# Patient Record
Sex: FEMALE | Race: OTHER | Employment: FULL TIME | URBAN - METROPOLITAN AREA
[De-identification: names, ages, dates, MRNs, and addresses within clinical notes are randomized per-mention and may not be internally consistent; named-entity substitution may affect disease eponyms.]

---

## 2023-01-20 ENCOUNTER — OFFICE VISIT (OUTPATIENT)
Dept: FAMILY MEDICINE CLINIC | Age: 33
End: 2023-01-20
Payer: COMMERCIAL

## 2023-01-20 VITALS
BODY MASS INDEX: 19.97 KG/M2 | WEIGHT: 117 LBS | OXYGEN SATURATION: 97 % | DIASTOLIC BLOOD PRESSURE: 68 MMHG | HEART RATE: 73 BPM | TEMPERATURE: 96.1 F | HEIGHT: 64 IN | SYSTOLIC BLOOD PRESSURE: 98 MMHG

## 2023-01-20 DIAGNOSIS — R68.89 FLU-LIKE SYMPTOMS: Primary | ICD-10-CM

## 2023-01-20 DIAGNOSIS — B34.9 VIRAL ILLNESS: ICD-10-CM

## 2023-01-20 LAB
INFLUENZA A ANTIBODY: NORMAL
INFLUENZA B ANTIBODY: NORMAL
Lab: NORMAL
PERFORMING INSTRUMENT: NORMAL
QC PASS/FAIL: NORMAL
SARS-COV-2, POC: NORMAL

## 2023-01-20 PROCEDURE — 99213 OFFICE O/P EST LOW 20 MIN: CPT | Performed by: NURSE PRACTITIONER

## 2023-01-20 PROCEDURE — 87804 INFLUENZA ASSAY W/OPTIC: CPT | Performed by: NURSE PRACTITIONER

## 2023-01-20 PROCEDURE — 87426 SARSCOV CORONAVIRUS AG IA: CPT | Performed by: NURSE PRACTITIONER

## 2023-01-20 SDOH — ECONOMIC STABILITY: FOOD INSECURITY: WITHIN THE PAST 12 MONTHS, THE FOOD YOU BOUGHT JUST DIDN'T LAST AND YOU DIDN'T HAVE MONEY TO GET MORE.: NEVER TRUE

## 2023-01-20 SDOH — ECONOMIC STABILITY: FOOD INSECURITY: WITHIN THE PAST 12 MONTHS, YOU WORRIED THAT YOUR FOOD WOULD RUN OUT BEFORE YOU GOT MONEY TO BUY MORE.: NEVER TRUE

## 2023-01-20 ASSESSMENT — PATIENT HEALTH QUESTIONNAIRE - PHQ9
SUM OF ALL RESPONSES TO PHQ QUESTIONS 1-9: 0
1. LITTLE INTEREST OR PLEASURE IN DOING THINGS: 0
SUM OF ALL RESPONSES TO PHQ9 QUESTIONS 1 & 2: 0
SUM OF ALL RESPONSES TO PHQ QUESTIONS 1-9: 0
2. FEELING DOWN, DEPRESSED OR HOPELESS: 0
SUM OF ALL RESPONSES TO PHQ QUESTIONS 1-9: 0
SUM OF ALL RESPONSES TO PHQ QUESTIONS 1-9: 0

## 2023-01-20 ASSESSMENT — ENCOUNTER SYMPTOMS
EYE REDNESS: 0
NAUSEA: 0
COUGH: 1
EYE PAIN: 0
DIARRHEA: 0
VOMITING: 0
ABDOMINAL PAIN: 0
BACK PAIN: 0
WHEEZING: 0
PHOTOPHOBIA: 0
SINUS PAIN: 0
SHORTNESS OF BREATH: 0
SORE THROAT: 0

## 2023-01-20 ASSESSMENT — SOCIAL DETERMINANTS OF HEALTH (SDOH): HOW HARD IS IT FOR YOU TO PAY FOR THE VERY BASICS LIKE FOOD, HOUSING, MEDICAL CARE, AND HEATING?: NOT HARD AT ALL

## 2023-01-20 NOTE — PROGRESS NOTES
Subjective:      Patient ID: All Joseph is a 34 y.o. female who presents today for:  Chief Complaint   Patient presents with    URI     Fever, body aches, sore throat, sore gland, start 1/19       HPI pt states congestion for few days- but then started with a fever yesterday and just feels ran over and burning behind her eyes. Feels like her glands are swollen on the right side    No past medical history on file. No past surgical history on file. No family history on file. Social History     Socioeconomic History    Marital status: Single     Spouse name: Not on file    Number of children: Not on file    Years of education: Not on file    Highest education level: Not on file   Occupational History    Not on file   Tobacco Use    Smoking status: Never    Smokeless tobacco: Never   Substance and Sexual Activity    Alcohol use: Never    Drug use: Never    Sexual activity: Not on file   Other Topics Concern    Not on file   Social History Narrative    Not on file     Social Determinants of Health     Financial Resource Strain: Low Risk     Difficulty of Paying Living Expenses: Not hard at all   Food Insecurity: No Food Insecurity    Worried About Running Out of Food in the Last Year: Never true    Ran Out of Food in the Last Year: Never true   Transportation Needs: Not on file   Physical Activity: Not on file   Stress: Not on file   Social Connections: Not on file   Intimate Partner Violence: Not on file   Housing Stability: Not on file     No current outpatient medications on file prior to visit. No current facility-administered medications on file prior to visit.     :  Patient has no known allergies. Review of Systems   Constitutional:  Positive for chills and fever. Negative for diaphoresis. HENT:  Positive for congestion and nosebleeds. Negative for ear pain, sinus pain, sore throat and tinnitus. Eyes:  Negative for photophobia, pain and redness. Respiratory:  Positive for cough. Negative for shortness of breath and wheezing. Cardiovascular:  Negative for chest pain, palpitations and leg swelling. Gastrointestinal:  Negative for abdominal pain, diarrhea, nausea and vomiting. Genitourinary:  Negative for dysuria, flank pain, frequency and urgency. Musculoskeletal:  Negative for back pain and myalgias. Skin:  Negative for rash. Neurological:  Negative for dizziness, tremors, seizures, weakness and headaches. Hematological:  Does not bruise/bleed easily. Psychiatric/Behavioral:  The patient is not nervous/anxious. Objective:   BP 98/68   Pulse 73   Temp (!) 96.1 °F (35.6 °C)   Ht 5' 4\" (1.626 m)   Wt 117 lb (53.1 kg)   SpO2 97%   BMI 20.08 kg/m²     Physical Exam  Vitals reviewed: flushed. Constitutional:       General: She is not in acute distress. Appearance: She is not diaphoretic. HENT:      Head: Normocephalic and atraumatic. No Steiner's sign, right periorbital erythema or left periorbital erythema. Jaw: No trismus. Right Ear: There is impacted cerumen. Left Ear: There is impacted cerumen. Nose: Congestion present. No rhinorrhea. Mouth/Throat:      Mouth: Mucous membranes are moist.      Pharynx: Posterior oropharyngeal erythema present. Eyes:      General: No scleral icterus. Extraocular Movements:      Right eye: Normal extraocular motion. Left eye: Normal extraocular motion. Conjunctiva/sclera: Conjunctivae normal.      Right eye: Right conjunctiva is not injected. Left eye: Left conjunctiva is not injected. Pupils: Pupils are equal, round, and reactive to light. Neck:      Thyroid: No thyromegaly. Trachea: Trachea normal. No tracheal deviation. Meningeal: Brudzinski's sign absent. Cardiovascular:      Rate and Rhythm: Normal rate and regular rhythm. Heart sounds: Normal heart sounds. No murmur heard. No gallop.    Pulmonary:      Effort: Pulmonary effort is normal.      Breath sounds: Normal breath sounds. No wheezing or rales. Abdominal:      General: Bowel sounds are normal. There is no distension. Palpations: Abdomen is soft. Tenderness: There is no abdominal tenderness. There is no guarding. Musculoskeletal:         General: Normal range of motion. Cervical back: Normal range of motion and neck supple. Lymphadenopathy:      Cervical: No cervical adenopathy. Skin:     General: Skin is warm and dry. Coloration: Skin is not pale. Findings: No erythema or rash. Neurological:      Mental Status: She is alert and oriented to person, place, and time. Cranial Nerves: No cranial nerve deficit. Coordination: Coordination normal.       Procedures   :       Diagnosis Orders   1. Flu-like symptoms  POCT COVID-19, Antigen    POCT Influenza A/B      2. Viral illness            :      Orders Placed This Encounter   Procedures    POCT COVID-19, Antigen     Order Specific Question:   Is this test for diagnosis or screening? Answer:   Screening     Order Specific Question:   Symptomatic for COVID-19 as defined by CDC? Answer:   No     Order Specific Question:   Date of Symptom Onset     Answer:   N/A     Order Specific Question:   Hospitalized for COVID-19? Answer:   No     Order Specific Question:   Admitted to ICU for COVID-19? Answer:   No     Order Specific Question:   Employed in healthcare setting? Answer:   No     Order Specific Question:   Resident in a congregate (group) care setting? Answer:   No     Order Specific Question:   Pregnant? Answer:   No     Order Specific Question:   Previously tested for COVID-19? Answer:   Yes    POCT Influenza A/B         No orders of the defined types were placed in this encounter.     No lymph nodes enlarged  Pt has tenderness at jaw line by ear- but no noted glad enlargement  Spoke with pt to add ibuprofen for the anti inflammatory effect and to keep taking her decongestant   All testing negative here today   But continue with symptomatic tx    Return if symptoms worsen or fail to improve.    Reviewed with the patient: current clinicalstatus, medications, activities and diet.     Side effects, adverse effects of the medication prescribedtoday, as well as treatment plan/ rationale and result expectations have been discussedwith the patient who expresses understanding and desires to proceed.    Close follow upto evaluate treatment results and for coordination of care.  I have reviewedthe patient's medical history in detail and updated the computerized patient record.    Dyana Barajas, APRN - CNP

## 2023-01-27 ENCOUNTER — OFFICE VISIT (OUTPATIENT)
Dept: FAMILY MEDICINE CLINIC | Age: 33
End: 2023-01-27

## 2023-01-27 VITALS
RESPIRATION RATE: 18 BRPM | HEIGHT: 64 IN | TEMPERATURE: 97 F | DIASTOLIC BLOOD PRESSURE: 68 MMHG | WEIGHT: 116 LBS | BODY MASS INDEX: 19.81 KG/M2 | SYSTOLIC BLOOD PRESSURE: 100 MMHG | OXYGEN SATURATION: 98 % | HEART RATE: 91 BPM

## 2023-01-27 DIAGNOSIS — M54.6 CHRONIC MIDLINE THORACIC BACK PAIN: ICD-10-CM

## 2023-01-27 DIAGNOSIS — R53.83 OTHER FATIGUE: ICD-10-CM

## 2023-01-27 DIAGNOSIS — G89.29 CHRONIC MIDLINE THORACIC BACK PAIN: ICD-10-CM

## 2023-01-27 DIAGNOSIS — Z00.00 ENCOUNTER FOR MEDICAL EXAMINATION TO ESTABLISH CARE: ICD-10-CM

## 2023-01-27 DIAGNOSIS — M54.41 CHRONIC BILATERAL LOW BACK PAIN WITH RIGHT-SIDED SCIATICA: Primary | ICD-10-CM

## 2023-01-27 DIAGNOSIS — G89.29 CHRONIC BILATERAL LOW BACK PAIN WITH RIGHT-SIDED SCIATICA: Primary | ICD-10-CM

## 2023-01-27 ASSESSMENT — PATIENT HEALTH QUESTIONNAIRE - PHQ9
SUM OF ALL RESPONSES TO PHQ9 QUESTIONS 1 & 2: 2
6. FEELING BAD ABOUT YOURSELF - OR THAT YOU ARE A FAILURE OR HAVE LET YOURSELF OR YOUR FAMILY DOWN: 1
3. TROUBLE FALLING OR STAYING ASLEEP: 1
SUM OF ALL RESPONSES TO PHQ QUESTIONS 1-9: 8
8. MOVING OR SPEAKING SO SLOWLY THAT OTHER PEOPLE COULD HAVE NOTICED. OR THE OPPOSITE, BEING SO FIGETY OR RESTLESS THAT YOU HAVE BEEN MOVING AROUND A LOT MORE THAN USUAL: 1
2. FEELING DOWN, DEPRESSED OR HOPELESS: 1
SUM OF ALL RESPONSES TO PHQ QUESTIONS 1-9: 9
SUM OF ALL RESPONSES TO PHQ QUESTIONS 1-9: 9
10. IF YOU CHECKED OFF ANY PROBLEMS, HOW DIFFICULT HAVE THESE PROBLEMS MADE IT FOR YOU TO DO YOUR WORK, TAKE CARE OF THINGS AT HOME, OR GET ALONG WITH OTHER PEOPLE: 0
9. THOUGHTS THAT YOU WOULD BE BETTER OFF DEAD, OR OF HURTING YOURSELF: 1
SUM OF ALL RESPONSES TO PHQ QUESTIONS 1-9: 9
5. POOR APPETITE OR OVEREATING: 1
1. LITTLE INTEREST OR PLEASURE IN DOING THINGS: 1
7. TROUBLE CONCENTRATING ON THINGS, SUCH AS READING THE NEWSPAPER OR WATCHING TELEVISION: 1
4. FEELING TIRED OR HAVING LITTLE ENERGY: 1

## 2023-01-27 ASSESSMENT — ENCOUNTER SYMPTOMS
BOWEL INCONTINENCE: 0
SINUS PRESSURE: 0
BACK PAIN: 1
COUGH: 0
VOMITING: 0
SINUS PAIN: 0
NAUSEA: 0
ABDOMINAL PAIN: 0
SORE THROAT: 0
DIARRHEA: 0
SHORTNESS OF BREATH: 0
CHEST TIGHTNESS: 0

## 2023-01-27 ASSESSMENT — COLUMBIA-SUICIDE SEVERITY RATING SCALE - C-SSRS
6. HAVE YOU EVER DONE ANYTHING, STARTED TO DO ANYTHING, OR PREPARED TO DO ANYTHING TO END YOUR LIFE?: NO
2. HAVE YOU ACTUALLY HAD ANY THOUGHTS OF KILLING YOURSELF?: NO
1. WITHIN THE PAST MONTH, HAVE YOU WISHED YOU WERE DEAD OR WISHED YOU COULD GO TO SLEEP AND NOT WAKE UP?: NO

## 2023-01-27 ASSESSMENT — VISUAL ACUITY: OU: 1

## 2023-01-27 NOTE — PROGRESS NOTES
Subjective  Travis STEPHENSON 1990 is a 34 y.o. female who presents today with:  Chief Complaint   Patient presents with    Establish Care    Back Pain     X 3 years; recently has been getting worse; between mid and upper     Hand Pain     Bilateral hand pain; cramps up      Est. Care. New to [facility]. Back Pain  This is a chronic problem. Episode onset: x3 years ago lower back. Now into middle x2.5 years. The problem occurs constantly. The pain is present in the thoracic spine and lumbar spine. The quality of the pain is described as aching. Pain scale: 3/10 with activity 7/10. The pain is moderate. The symptoms are aggravated by bending and twisting. Stiffness is present All day. Associated symptoms include leg pain (occasionally- right side) and paresthesias. Pertinent negatives include no abdominal pain, bladder incontinence, bowel incontinence, chest pain, dysuria, fever, headaches, numbness, paresis, pelvic pain, perianal numbness, tingling, weakness or weight loss. Risk factors include history of cancer. Depression screening positive  - Seasonal Depression- believes, usually having a lot of sun where she lived and different here. - Using light therapy to help. Working well. Paternal Side-  Ovarian cancer- cousin's younger (30 years ago). Aunt (51years). Grandmother (64 years)  Liver cancer  Lupus    Maternal  - HTN  - Prediabetic      Review of Systems   Constitutional:  Negative for activity change, appetite change, chills, fever and weight loss. HENT:  Negative for congestion, drooling, sinus pressure, sinus pain and sore throat. Eyes:  Negative for visual disturbance. Respiratory:  Negative for cough, chest tightness and shortness of breath. Cardiovascular:  Negative for chest pain. Gastrointestinal:  Negative for abdominal pain, bowel incontinence, diarrhea, nausea and vomiting. Endocrine: Negative for cold intolerance.    Genitourinary:  Negative for bladder incontinence, dysuria, flank pain, frequency, hematuria and pelvic pain. Musculoskeletal:  Positive for back pain. Negative for arthralgias. Skin:  Negative for rash. Allergic/Immunologic: Negative for food allergies. Neurological:  Positive for paresthesias. Negative for tingling, weakness, light-headedness, numbness and headaches. Hematological:  Does not bruise/bleed easily. No past medical history on file. No past surgical history on file. Social History     Socioeconomic History    Marital status: Single     Spouse name: Not on file    Number of children: Not on file    Years of education: Not on file    Highest education level: Not on file   Occupational History    Not on file   Tobacco Use    Smoking status: Never    Smokeless tobacco: Never   Substance and Sexual Activity    Alcohol use: Never    Drug use: Never    Sexual activity: Not on file   Other Topics Concern    Not on file   Social History Narrative    Not on file     Social Determinants of Health     Financial Resource Strain: Low Risk     Difficulty of Paying Living Expenses: Not hard at all   Food Insecurity: No Food Insecurity    Worried About Running Out of Food in the Last Year: Never true    Ran Out of Food in the Last Year: Never true   Transportation Needs: Not on file   Physical Activity: Not on file   Stress: Not on file   Social Connections: Not on file   Intimate Partner Violence: Not on file   Housing Stability: Not on file     Family History   Problem Relation Age of Onset    Diabetes Mother     High Blood Pressure Mother     Lupus Father      No Known Allergies  No current outpatient medications on file. No current facility-administered medications for this visit. PMH, Surgical Hx, Family Hx, and Social Hx reviewed and updated. Health Maintenance reviewed.     Objective  Vitals:    01/27/23 1615   BP: 100/68   Pulse: 91   Resp: 18   Temp: 97 °F (36.1 °C)   TempSrc: Temporal   SpO2: 98%   Weight: 116 lb (52.6 kg)   Height: 5' 4\" (1.626 m)     BP Readings from Last 3 Encounters:   01/27/23 100/68   01/20/23 98/68     Wt Readings from Last 3 Encounters:   01/27/23 116 lb (52.6 kg)   01/20/23 117 lb (53.1 kg)       No results found for: LABA1C  Lab Results   Component Value Date    CREATININE 0.66 03/30/2021     Lab Results   Component Value Date    ALT 12 03/30/2021    AST 15 03/30/2021     Lab Results   Component Value Date    CHOL 163 03/30/2021    TRIG 71 03/30/2021    HDL 54 03/30/2021    [address] 95 03/30/2021          Physical Exam  Vitals and nursing note reviewed. Constitutional:       General: She is awake. She is not in acute distress. Appearance: Normal appearance. She is well-developed. She is not ill-appearing, toxic-appearing or diaphoretic. HENT:      Head: Normocephalic and atraumatic. Right Ear: Hearing and external ear normal.      Left Ear: Hearing and external ear normal.      Nose: Nose normal.   Eyes:      General: Lids are normal. Vision grossly intact. Gaze aligned appropriately. Conjunctiva/sclera: Conjunctivae normal.      Pupils: Pupils are equal, round, and reactive to light. Cardiovascular:      Rate and Rhythm: Normal rate and regular rhythm. Pulses: Normal pulses. Heart sounds: Normal heart sounds, S1 normal and S2 normal.   Pulmonary:      Effort: Pulmonary effort is normal.      Breath sounds: Normal breath sounds and air entry. Musculoskeletal:      Cervical back: Normal range of motion. Skin:     General: Skin is warm. Capillary Refill: Capillary refill takes less than 2 seconds. Neurological:      Mental Status: She is alert and oriented to person, place, and time. Gait: Gait is intact. Psychiatric:         Attention and Perception: Attention normal.         Mood and Affect: Mood normal.         Speech: Speech normal.         Behavior: Behavior normal. Behavior is cooperative. Assessment & Plan   1.  Chronic bilateral low back pain with right-sided sciatica  -     XR LUMBAR SPINE (2-3 VIEWS); Future  -     [facility] - [place]  2. Chronic midline thoracic back pain  -     XR THORACIC SPINE (2 VIEWS); Future  -     [facility] - [place]  3. Other fatigue  -     Vitamin D 25 Hydroxy; Future  4. Encounter for medical examination to establish care   Orders Placed This Encounter   Procedures    XR LUMBAR SPINE (2-3 VIEWS)     Standing Status:   Future     Standing Expiration Date:   1/27/2024     Order Specific Question:   Reason for exam:     Answer:   TTP over lumbar spine, no step of deformity. Carroll tendersness. XR THORACIC SPINE (2 VIEWS)     Standing Status:   Future     Standing Expiration Date:   1/27/2024     Order Specific Question:   Reason for exam:     Answer:   TTP over thoracic spine, no step of deformity. Carroll tendersness. Vitamin D 25 Hydroxy     Standing Status:   Future     Standing Expiration Date:   1/27/2024    [facility] - [place]     Referral Priority:   Routine     Referral Type:   Eval and Treat     Referral Reason:   Specialty Services Required     Requested Specialty:   Physical Therapist     Number of Visits Requested:   1     No orders of the defined types were placed in this encounter. There are no discontinued medications. Return in about 6 months (around 7/27/2023) for Follow Up. Reviewed with the patient: current clinical status, medications, activities and diet. Side effects, adverse effects of the medication prescribed today, as well as treatment plan/ rationale and result expectations have been discussed with the patient who expresses understanding and desires to proceed. Close follow up to evaluate treatment results and for coordination of care. I have reviewed the patient's medical history in detail and updated the computerized patient record.     Payton Seymour

## 2023-01-30 ENCOUNTER — HOSPITAL ENCOUNTER (OUTPATIENT)
Dept: GENERAL RADIOLOGY | Age: 33
Discharge: HOME | End: 2023-02-01
Payer: COMMERCIAL

## 2023-01-30 DIAGNOSIS — G89.29 CHRONIC BILATERAL LOW BACK PAIN WITH RIGHT-SIDED SCIATICA: ICD-10-CM

## 2023-01-30 DIAGNOSIS — M54.41 CHRONIC BILATERAL LOW BACK PAIN WITH RIGHT-SIDED SCIATICA: ICD-10-CM

## 2023-01-30 DIAGNOSIS — M54.6 CHRONIC MIDLINE THORACIC BACK PAIN: ICD-10-CM

## 2023-01-30 DIAGNOSIS — G89.29 CHRONIC MIDLINE THORACIC BACK PAIN: ICD-10-CM

## 2023-01-30 PROCEDURE — 72100 X-RAY EXAM L-S SPINE 2/3 VWS: CPT

## 2023-01-30 PROCEDURE — 72070 X-RAY EXAM THORAC SPINE 2VWS: CPT

## 2023-02-02 DIAGNOSIS — R53.83 OTHER FATIGUE: ICD-10-CM

## 2023-02-02 LAB — VITAMIN D 25-HYDROXY: 26.1 NG/ML

## 2023-03-13 ENCOUNTER — TELEMEDICINE (OUTPATIENT)
Dept: FAMILY MEDICINE CLINIC | Age: 33
End: 2023-03-13
Payer: COMMERCIAL

## 2023-03-13 DIAGNOSIS — L71.9 ACNE ROSACEA: Primary | ICD-10-CM

## 2023-03-13 PROCEDURE — 99213 OFFICE O/P EST LOW 20 MIN: CPT | Performed by: PHYSICIAN ASSISTANT

## 2023-03-13 RX ORDER — METRONIDAZOLE 7.5 MG/G
GEL TOPICAL
Qty: 45 G | Refills: 1 | Status: SHIPPED | OUTPATIENT
Start: 2023-03-13

## 2023-03-13 SDOH — ECONOMIC STABILITY: FOOD INSECURITY: WITHIN THE PAST 12 MONTHS, THE FOOD YOU BOUGHT JUST DIDN'T LAST AND YOU DIDN'T HAVE MONEY TO GET MORE.: NEVER TRUE

## 2023-03-13 SDOH — ECONOMIC STABILITY: INCOME INSECURITY: HOW HARD IS IT FOR YOU TO PAY FOR THE VERY BASICS LIKE FOOD, HOUSING, MEDICAL CARE, AND HEATING?: NOT HARD AT ALL

## 2023-03-13 SDOH — ECONOMIC STABILITY: HOUSING INSECURITY
IN THE LAST 12 MONTHS, WAS THERE A TIME WHEN YOU DID NOT HAVE A STEADY PLACE TO SLEEP OR SLEPT IN A SHELTER (INCLUDING NOW)?: NO

## 2023-03-13 SDOH — ECONOMIC STABILITY: FOOD INSECURITY: WITHIN THE PAST 12 MONTHS, YOU WORRIED THAT YOUR FOOD WOULD RUN OUT BEFORE YOU GOT MONEY TO BUY MORE.: NEVER TRUE

## 2023-03-13 ASSESSMENT — ENCOUNTER SYMPTOMS
SHORTNESS OF BREATH: 0
SINUS PRESSURE: 0
BACK PAIN: 0
SORE THROAT: 0
VOMITING: 0
COUGH: 0
CHEST TIGHTNESS: 0
DIARRHEA: 0
SINUS PAIN: 0
ABDOMINAL PAIN: 0
NAUSEA: 0

## 2023-03-13 NOTE — PROGRESS NOTES
3/13/2023    TELEHEALTH EVALUATION -- Audio/Visual ([facility])    Due to Ann-Marie 19 outbreak, patient's office visit was converted to a virtual visit. Patient was contacted and agreed to proceed with a virtual visit via [email or web address] me  The risks and benefits of converting to a virtual visit were discussed in light of the current infectious disease epidemic. Patient also understood that insurance coverage and co-pays are up to their individual insurance plans. HPI:    Aiden Sanders (:  1990) has requested an audio/video evaluation for the following concern(s):    Patient has history of rosacea. Has been on metronidazole gel in the past that really helped with her symptoms in the past. Patient stopped using it for a while after improvement. Now is starting to have recurrent symptoms. No fevers, chills, or photosensitivity. Review of Systems   Constitutional:  Negative for activity change, appetite change, chills and fever. HENT:  Negative for congestion, drooling, sinus pressure, sinus pain and sore throat. Eyes:  Negative for visual disturbance. Respiratory:  Negative for cough, chest tightness and shortness of breath. Cardiovascular:  Negative for chest pain. Gastrointestinal:  Negative for abdominal pain, diarrhea, nausea and vomiting. Endocrine: Negative for cold intolerance. Genitourinary:  Negative for dysuria, flank pain, frequency and hematuria. Musculoskeletal:  Negative for arthralgias and back pain. Skin:  Positive for rash. Allergic/Immunologic: Negative for food allergies. Neurological:  Negative for weakness, light-headedness, numbness and headaches. Hematological:  Does not bruise/bleed easily. Prior to Visit Medications    Medication Sig Taking? Authorizing Provider   metroNIDAZOLE (METROGEL) 0.75 % gel Apply topically 2 times daily.  Yes LEOLA Wilson       Social History     Tobacco Use    Smoking status: Never Smokeless tobacco: Never   Substance Use Topics    Alcohol use: Never    Drug use: Never            PHYSICAL EXAMINATION:  [ INSTRUCTIONS:  \"[x]\" Indicates a positive item  \"[]\" Indicates a negative item  -- DELETE ALL ITEMS NOT EXAMINED]  [x] Alert  [x] Oriented to person/place/time    [x] No apparent distress  [] Toxic appearing    [x] Face flushed appearing [x] Sclera clear  [] Lips are cyanotic      [x] Breathing appears normal  [] Appears tachypneic      [x] Rash on visible skin    [] Cranial Nerves II-XII grossly intact    [] Motor grossly intact in visible upper extremities    [] Motor grossly intact in visible lower extremities    [] Normal Mood  [] Anxious appearing    [] Depressed appearing  [] Confused appearing      [] Poor short term memory  [] Poor long term memory    [] OTHER:  face appear red and flushed around the cheeks. Due to this being a TeleHealth encounter, evaluation of the following organ systems is limited: Vitals/Constitutional/EENT/Resp/CV/GI//MS/Neuro/Skin/Heme-Lymph-Imm. ASSESSMENT/PLAN:  1. Acne rosacea  - Went over possible s/e of the medications. Patient would like to proceed with therapy. - Discussed signs and symptoms which require immediate follow-up in ED/call to 911. Patient verbalized understanding.    - metroNIDAZOLE (METROGEL) 0.75 % gel; Apply topically 2 times daily. Dispense: 45 g; Refill: 1    Return if symptoms worsen or fail to improve. An  electronic signature was used to authenticate this note. --LEOLA Ortega on 3/13/2023 at 8:28 AM        Pursuant to the emergency declaration under the [facility], [address] [place] [address] authority and the [employer] Act, this Virtual  Visit was conducted, with patient's consent, to reduce the patient's risk of exposure to COVID-19 and provide continuity of care for an established patient.     Services were provided through a video synchronous discussion virtually to substitute for in-person clinic visit.

## 2023-05-09 ENCOUNTER — HOSPITAL ENCOUNTER (EMERGENCY)
Age: 33
Discharge: HOME | End: 2023-05-10
Attending: EMERGENCY MEDICINE
Payer: COMMERCIAL

## 2023-05-09 DIAGNOSIS — L23.9 ALLERGIC DERMATITIS: Primary | ICD-10-CM

## 2023-05-09 PROCEDURE — 99283 EMERGENCY DEPT VISIT LOW MDM: CPT

## 2023-05-09 PROCEDURE — 6370000000 HC RX 637 (ALT 250 FOR IP): Performed by: EMERGENCY MEDICINE

## 2023-05-09 RX ORDER — DIAPER,BRIEF,INFANT-TODD,DISP
EACH MISCELLANEOUS ONCE
Status: COMPLETED | OUTPATIENT
Start: 2023-05-09 | End: 2023-05-10

## 2023-05-09 RX ORDER — DIPHENHYDRAMINE HCL 25 MG
25 TABLET ORAL
Status: COMPLETED | OUTPATIENT
Start: 2023-05-09 | End: 2023-05-09

## 2023-05-09 RX ADMIN — DIPHENHYDRAMINE HCL 25 MG: 25 TABLET ORAL at 23:53

## 2023-05-09 ASSESSMENT — ENCOUNTER SYMPTOMS
ABDOMINAL DISTENTION: 0
PHOTOPHOBIA: 0
WHEEZING: 0
CHEST TIGHTNESS: 0
COUGH: 0
EYE DISCHARGE: 0
SORE THROAT: 0
ABDOMINAL PAIN: 0
VOMITING: 0
SHORTNESS OF BREATH: 0

## 2023-05-10 VITALS
DIASTOLIC BLOOD PRESSURE: 63 MMHG | WEIGHT: 119 LBS | TEMPERATURE: 98.2 F | OXYGEN SATURATION: 99 % | RESPIRATION RATE: 16 BRPM | SYSTOLIC BLOOD PRESSURE: 126 MMHG | HEART RATE: 87 BPM | BODY MASS INDEX: 21.09 KG/M2 | HEIGHT: 63 IN

## 2023-05-10 PROCEDURE — 6370000000 HC RX 637 (ALT 250 FOR IP): Performed by: EMERGENCY MEDICINE

## 2023-05-10 RX ADMIN — HYDROCORTISONE: 1 CREAM TOPICAL at 00:03

## 2023-05-10 ASSESSMENT — LIFESTYLE VARIABLES
HOW MANY STANDARD DRINKS CONTAINING ALCOHOL DO YOU HAVE ON A TYPICAL DAY: PATIENT DOES NOT DRINK
HOW OFTEN DO YOU HAVE A DRINK CONTAINING ALCOHOL: NEVER

## 2023-05-10 ASSESSMENT — PAIN - FUNCTIONAL ASSESSMENT: PAIN_FUNCTIONAL_ASSESSMENT: NONE - DENIES PAIN

## 2023-05-10 NOTE — ED NOTES
Pt states was at work and came in contract with \"[employer]\" and pt states reaction on right forearm where chemical touched pt's skin. Pt denies any respiratory symptoms at this time. Pt denies trouble breathing or swallowing.       Mansoor Ye  05/09/23 [id number]

## 2023-05-10 NOTE — ED PROVIDER NOTES
[address] [facility] ED  eMERGENCY dEPARTMENT eNCOUnter      Pt Name: Lore Manning  MRN: [id number]  [place] 1990  Date of evaluation: 5/9/2023  Provider: Neha Holt MD    CHIEF COMPLAINT       Chief Complaint   Patient presents with    Allergic Reaction         HISTORY OF PRESENT ILLNESS   (Location/Symptom, Timing/Onset,Context/Setting, Quality, Duration, Modifying Factors, Severity)  Note limiting factors. Lore Manning is a 34 y.o. female who presents to the emergency department a local allergic reaction. Patient works in housekeeping and spilled some Birex on her right forearm and has a local reaction approximately 4 x 7 cm to the right forearm. This is a general stable age and use to clean countertops and other medical instruments. Mild itching no other discomfort. No other complaints. No other reaction. No lip or tongue swelling. No difficulty breathing    HPI    NursingNotes were reviewed. REVIEW OF SYSTEMS    (2-9 systems for level 4, 10 or more for level 5)     Review of Systems   Constitutional:  Negative for chills and diaphoresis. HENT:  Negative for congestion, ear pain, mouth sores and sore throat. Eyes:  Negative for photophobia and discharge. Respiratory:  Negative for cough, chest tightness, shortness of breath and wheezing. Cardiovascular:  Negative for chest pain and palpitations. Gastrointestinal:  Negative for abdominal distention, abdominal pain and vomiting. Endocrine: Negative for cold intolerance. Genitourinary:  Negative for difficulty urinating. Musculoskeletal:  Negative for arthralgias. Skin:  Positive for rash. Negative for pallor. Allergic/Immunologic: Negative for immunocompromised state. Neurological:  Negative for dizziness and syncope. Hematological:  Negative for adenopathy. Psychiatric/Behavioral:  Negative for agitation and hallucinations. All other systems reviewed and are negative.     Except as noted

## 2023-07-27 ENCOUNTER — TELEPHONE (OUTPATIENT)
Dept: FAMILY MEDICINE CLINIC | Age: 33
End: 2023-07-27

## 2023-09-14 ENCOUNTER — OFFICE VISIT (OUTPATIENT)
Dept: FAMILY MEDICINE CLINIC | Age: 33
End: 2023-09-14
Payer: COMMERCIAL

## 2023-09-14 VITALS
DIASTOLIC BLOOD PRESSURE: 78 MMHG | BODY MASS INDEX: 21.79 KG/M2 | WEIGHT: 123 LBS | HEART RATE: 82 BPM | TEMPERATURE: 97.5 F | SYSTOLIC BLOOD PRESSURE: 118 MMHG | HEIGHT: 63 IN | OXYGEN SATURATION: 100 % | RESPIRATION RATE: 16 BRPM

## 2023-09-14 DIAGNOSIS — Z00.00 ENCOUNTER FOR WELL ADULT EXAM WITHOUT ABNORMAL FINDINGS: ICD-10-CM

## 2023-09-14 DIAGNOSIS — F32.A DEPRESSION, UNSPECIFIED DEPRESSION TYPE: Primary | ICD-10-CM

## 2023-09-14 PROCEDURE — 99395 PREV VISIT EST AGE 18-39: CPT | Performed by: PHYSICIAN ASSISTANT

## 2023-09-14 ASSESSMENT — PATIENT HEALTH QUESTIONNAIRE - PHQ9
SUM OF ALL RESPONSES TO PHQ QUESTIONS 1-9: 24
9. THOUGHTS THAT YOU WOULD BE BETTER OFF DEAD, OR OF HURTING YOURSELF: 0
4. FEELING TIRED OR HAVING LITTLE ENERGY: 3
SUM OF ALL RESPONSES TO PHQ QUESTIONS 1-9: 24
SUM OF ALL RESPONSES TO PHQ QUESTIONS 1-9: 24
6. FEELING BAD ABOUT YOURSELF - OR THAT YOU ARE A FAILURE OR HAVE LET YOURSELF OR YOUR FAMILY DOWN: 3
2. FEELING DOWN, DEPRESSED OR HOPELESS: 3
7. TROUBLE CONCENTRATING ON THINGS, SUCH AS READING THE NEWSPAPER OR WATCHING TELEVISION: 3
5. POOR APPETITE OR OVEREATING: 3
3. TROUBLE FALLING OR STAYING ASLEEP: 3
1. LITTLE INTEREST OR PLEASURE IN DOING THINGS: 3
10. IF YOU CHECKED OFF ANY PROBLEMS, HOW DIFFICULT HAVE THESE PROBLEMS MADE IT FOR YOU TO DO YOUR WORK, TAKE CARE OF THINGS AT HOME, OR GET ALONG WITH OTHER PEOPLE: 2
SUM OF ALL RESPONSES TO PHQ9 QUESTIONS 1 & 2: 6
SUM OF ALL RESPONSES TO PHQ QUESTIONS 1-9: 24
8. MOVING OR SPEAKING SO SLOWLY THAT OTHER PEOPLE COULD HAVE NOTICED. OR THE OPPOSITE, BEING SO FIGETY OR RESTLESS THAT YOU HAVE BEEN MOVING AROUND A LOT MORE THAN USUAL: 3

## 2023-09-14 ASSESSMENT — ENCOUNTER SYMPTOMS
SHORTNESS OF BREATH: 0
COUGH: 0
SORE THROAT: 0
SINUS PRESSURE: 0
SINUS PAIN: 0
CHEST TIGHTNESS: 0
ABDOMINAL PAIN: 0
NAUSEA: 0
BACK PAIN: 0
VOMITING: 0
DIARRHEA: 0

## 2023-09-14 ASSESSMENT — COLUMBIA-SUICIDE SEVERITY RATING SCALE - C-SSRS
6. HAVE YOU EVER DONE ANYTHING, STARTED TO DO ANYTHING, OR PREPARED TO DO ANYTHING TO END YOUR LIFE?: NO
1. WITHIN THE PAST MONTH, HAVE YOU WISHED YOU WERE DEAD OR WISHED YOU COULD GO TO SLEEP AND NOT WAKE UP?: NO
2. HAVE YOU ACTUALLY HAD ANY THOUGHTS OF KILLING YOURSELF?: NO

## 2023-09-14 ASSESSMENT — VISUAL ACUITY: OU: 1

## 2023-09-14 NOTE — PROGRESS NOTES
Well Adult Note  Name: Jose Miguel Douglas Date: 2023   MRN: [id number] Sex: Female   Age: 28 y.o. Ethnicity:  / Kiko Res   : 1990 Race:  / Roberto  is here for well adult exam.  History:  Depression  - PHQ-9 Total Score: 24 (2023  3:41 PM)  Thoughts that you would be better off dead, or of hurting yourself in some way: 0 (2023  3:41 PM)  - patient does not want to do medication. She would like to talk to therapist.   - patient feels safe at home  - no sleep disturbance. Not SI/HI. Patient denies any prior SSRI use. 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 Review of Systems   Constitutional:  Negative for activity change, appetite change, chills and fever. HENT:  Negative for congestion, drooling, sinus pressure, sinus pain and sore throat. Eyes:  Negative for visual disturbance. Respiratory:  Negative for cough, chest tightness and shortness of breath. Cardiovascular:  Negative for chest pain. Gastrointestinal:  Negative for abdominal pain, diarrhea, nausea and vomiting. Endocrine: Negative for cold intolerance. Genitourinary:  Negative for dysuria, flank pain, frequency and hematuria. Musculoskeletal:  Negative for arthralgias and back pain. Skin:  Negative for rash. Allergic/Immunologic: Negative for food allergies. Neurological:  Negative for weakness, light-headedness, numbness and headaches. Hematological:  Does not bruise/bleed easily. Psychiatric/Behavioral:  Positive for dysphoric mood. Negative for self-injury, sleep disturbance and suicidal ideas. No Known Allergies      Prior to Visit Medications    Medication Sig Taking? Authorizing Provider   metroNIDAZOLE (METROGEL) 0.75 % gel Apply topically 2 times daily. Patient not taking: Reported on 2023  LEOLA Salazar       History reviewed. No pertinent past medical history. History reviewed. No pertinent surgical history.       Family History

## 2023-10-11 ENCOUNTER — NURSE ONLY (OUTPATIENT)
Dept: FAMILY MEDICINE CLINIC | Age: 33
End: 2023-10-11
Payer: COMMERCIAL

## 2023-10-11 DIAGNOSIS — Z23 NEED FOR INFLUENZA VACCINATION: Primary | ICD-10-CM

## 2023-10-11 PROCEDURE — 90674 CCIIV4 VAC NO PRSV 0.5 ML IM: CPT | Performed by: PHYSICIAN ASSISTANT

## 2023-10-11 PROCEDURE — 90471 IMMUNIZATION ADMIN: CPT | Performed by: PHYSICIAN ASSISTANT

## 2023-10-11 NOTE — PROGRESS NOTES
Vaccine Information Sheet, \"Influenza - Inactivated\"  given to Brandon Rosenthal, or parent/legal guardian of  Brandon Rosenthal and verbalized understanding. Patient responses:    Have you ever had a reaction to a flu vaccine? No  Are you able to eat eggs without adverse effects? Yes   Do you have any current illness? No  Have you ever had Guillian Elbert Syndrome? No    Flu vaccine given per order. Please see immunization tab.

## 2024-01-30 ENCOUNTER — APPOINTMENT (OUTPATIENT)
Dept: GENERAL RADIOLOGY | Age: 34
End: 2024-01-30
Payer: OTHER MISCELLANEOUS

## 2024-01-30 ENCOUNTER — HOSPITAL ENCOUNTER (EMERGENCY)
Age: 34
Discharge: HOME | End: 2024-01-30
Payer: OTHER MISCELLANEOUS

## 2024-01-30 VITALS
BODY MASS INDEX: 21.26 KG/M2 | SYSTOLIC BLOOD PRESSURE: 97 MMHG | RESPIRATION RATE: 18 BRPM | HEART RATE: 70 BPM | OXYGEN SATURATION: 99 % | DIASTOLIC BLOOD PRESSURE: 58 MMHG | TEMPERATURE: 97.9 F | WEIGHT: 120 LBS

## 2024-01-30 DIAGNOSIS — S29.012A STRAIN OF THORACIC BACK REGION: ICD-10-CM

## 2024-01-30 DIAGNOSIS — S16.1XXA ACUTE STRAIN OF NECK MUSCLE, INITIAL ENCOUNTER: ICD-10-CM

## 2024-01-30 DIAGNOSIS — M25.562 ACUTE PAIN OF LEFT KNEE: ICD-10-CM
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
## 2024-01-30 LAB
HCG, URINE, POC: NEGATIVE
Lab: NORMAL
NEGATIVE QC PASS/FAIL: NORMAL
POSITIVE QC PASS/FAIL: NORMAL

## 2024-01-30 PROCEDURE — 72050 X-RAY EXAM NECK SPINE 4/5VWS: CPT

## 2024-01-30 PROCEDURE — 72074 X-RAY EXAM THORAC SPINE4/>VW: CPT

## 2024-01-30 PROCEDURE — 6370000000 HC RX 637 (ALT 250 FOR IP)

## 2024-01-30 PROCEDURE — 73560 X-RAY EXAM OF KNEE 1 OR 2: CPT

## 2024-01-30 PROCEDURE — 99283 EMERGENCY DEPT VISIT LOW MDM: CPT

## 2024-01-30 RX ORDER — IBUPROFEN 800 MG/1
800 TABLET ORAL ONCE
Status: COMPLETED | OUTPATIENT
Start: 2024-01-30 | End: 2024-01-30

## 2024-01-30 RX ORDER — NAPROXEN 500 MG/1
500 TABLET ORAL 2 TIMES DAILY WITH MEALS
Qty: 60 TABLET | Refills: 0 | Status: SHIPPED | OUTPATIENT
Start: 2024-01-30

## 2024-01-30 RX ADMIN — IBUPROFEN 800 MG: 800 TABLET, FILM COATED ORAL at 17:36

## 2024-01-30 ASSESSMENT — PAIN SCALES - GENERAL
PAINLEVEL_OUTOF10: 4
PAINLEVEL_OUTOF10: 4

## 2024-01-30 ASSESSMENT — LIFESTYLE VARIABLES
HOW OFTEN DO YOU HAVE A DRINK CONTAINING ALCOHOL: NEVER
HOW MANY STANDARD DRINKS CONTAINING ALCOHOL DO YOU HAVE ON A TYPICAL DAY: PATIENT DOES NOT DRINK

## 2024-01-30 ASSESSMENT — PAIN - FUNCTIONAL ASSESSMENT: PAIN_FUNCTIONAL_ASSESSMENT: 0-10

## 2024-01-30 ASSESSMENT — PAIN DESCRIPTION - PAIN TYPE: TYPE: ACUTE PAIN

## 2024-01-30 NOTE — DISCHARGE INSTRUCTIONS
Take medications as directed. RICE.     Follow-up with PCP.     Return to ED if any new, or worsening symptoms.
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
## 2024-02-05 ASSESSMENT — ENCOUNTER SYMPTOMS: BACK PAIN: 1
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
## 2024-03-15 ENCOUNTER — OFFICE VISIT (OUTPATIENT)
Dept: FAMILY MEDICINE CLINIC | Age: 34
End: 2024-03-15

## 2024-03-15 VITALS
TEMPERATURE: 98.5 F | OXYGEN SATURATION: 100 % | HEART RATE: 88 BPM | HEIGHT: 63 IN | WEIGHT: 118.17 LBS | DIASTOLIC BLOOD PRESSURE: 60 MMHG | BODY MASS INDEX: 20.94 KG/M2 | SYSTOLIC BLOOD PRESSURE: 97 MMHG | RESPIRATION RATE: 18 BRPM

## 2024-03-15 DIAGNOSIS — R09.81 NASAL CONGESTION: ICD-10-CM

## 2024-03-15 DIAGNOSIS — J01.40 ACUTE NON-RECURRENT PANSINUSITIS: Primary | ICD-10-CM

## 2024-03-15 RX ORDER — AMOXICILLIN AND CLAVULANATE POTASSIUM 875; 125 MG/1; MG/1
1 TABLET, FILM COATED ORAL 2 TIMES DAILY
Qty: 20 TABLET | Refills: 0 | Status: SHIPPED | OUTPATIENT
Start: 2024-03-15 | End: 2024-03-25

## 2024-03-15 SDOH — ECONOMIC STABILITY: FOOD INSECURITY: WITHIN THE PAST 12 MONTHS, YOU WORRIED THAT YOUR FOOD WOULD RUN OUT BEFORE YOU GOT MONEY TO BUY MORE.: NEVER TRUE

## 2024-03-15 SDOH — ECONOMIC STABILITY: FOOD INSECURITY: WITHIN THE PAST 12 MONTHS, THE FOOD YOU BOUGHT JUST DIDN'T LAST AND YOU DIDN'T HAVE MONEY TO GET MORE.: NEVER TRUE

## 2024-03-15 SDOH — ECONOMIC STABILITY: INCOME INSECURITY: HOW HARD IS IT FOR YOU TO PAY FOR THE VERY BASICS LIKE FOOD, HOUSING, MEDICAL CARE, AND HEATING?: NOT HARD AT ALL

## 2024-03-15 ASSESSMENT — PATIENT HEALTH QUESTIONNAIRE - PHQ9
9. THOUGHTS THAT YOU WOULD BE BETTER OFF DEAD, OR OF HURTING YOURSELF: 0
SUM OF ALL RESPONSES TO PHQ QUESTIONS 1-9: 0
6. FEELING BAD ABOUT YOURSELF - OR THAT YOU ARE A FAILURE OR HAVE LET YOURSELF OR YOUR FAMILY DOWN: 0
3. TROUBLE FALLING OR STAYING ASLEEP: 0
SUM OF ALL RESPONSES TO PHQ QUESTIONS 1-9: 0
1. LITTLE INTEREST OR PLEASURE IN DOING THINGS: 0
5. POOR APPETITE OR OVEREATING: 0
8. MOVING OR SPEAKING SO SLOWLY THAT OTHER PEOPLE COULD HAVE NOTICED. OR THE OPPOSITE, BEING SO FIGETY OR RESTLESS THAT YOU HAVE BEEN MOVING AROUND A LOT MORE THAN USUAL: 0
7. TROUBLE CONCENTRATING ON THINGS, SUCH AS READING THE NEWSPAPER OR WATCHING TELEVISION: 0
SUM OF ALL RESPONSES TO PHQ9 QUESTIONS 1 & 2: 0
10. IF YOU CHECKED OFF ANY PROBLEMS, HOW DIFFICULT HAVE THESE PROBLEMS MADE IT FOR YOU TO DO YOUR WORK, TAKE CARE OF THINGS AT HOME, OR GET ALONG WITH OTHER PEOPLE: 0
2. FEELING DOWN, DEPRESSED OR HOPELESS: 0
4. FEELING TIRED OR HAVING LITTLE ENERGY: 0

## 2024-03-15 ASSESSMENT — ENCOUNTER SYMPTOMS
DIARRHEA: 0
SORE THROAT: 0
ABDOMINAL PAIN: 0
SINUS PRESSURE: 1
NAUSEA: 0
COUGH: 0
SINUS PAIN: 1

## 2024-03-15 NOTE — PROGRESS NOTES
treatment results and for coordination of care.  I have reviewed the patient's medical history in detail and updated the computerized patient record.      SHIRLEY Cordoba - NP

## 2024-04-13 ENCOUNTER — HOSPITAL ENCOUNTER (INPATIENT)
Age: 34
LOS: 4 days | Discharge: HOME | DRG: 885 | End: 2024-04-17
Attending: PSYCHIATRY & NEUROLOGY | Admitting: PSYCHIATRY & NEUROLOGY
Payer: COMMERCIAL

## 2024-04-13 DIAGNOSIS — F32.9 MAJOR DEPRESSIVE DISORDER WITH CURRENT ACTIVE EPISODE, UNSPECIFIED DEPRESSION EPISODE SEVERITY, UNSPECIFIED WHETHER RECURRENT: Primary | ICD-10-CM

## 2024-04-13 PROBLEM — F32.A DEPRESSION: Status: ACTIVE | Noted: 2024-04-13

## 2024-04-13 LAB
ALBUMIN SERPL-MCNC: 4.2 G/DL (ref 3.5–4.6)
ALP SERPL-CCNC: 72 U/L (ref 40–130)
ALT SERPL-CCNC: 12 U/L (ref 0–33)
AMPHET UR QL SCN: ABNORMAL
ANION GAP SERPL CALCULATED.3IONS-SCNC: 12 MEQ/L (ref 9–15)
APAP SERPL-MCNC: <5 UG/ML (ref 10–30)
AST SERPL-CCNC: 14 U/L (ref 0–35)
BACTERIA URNS QL MICRO: NEGATIVE /HPF
BARBITURATES UR QL SCN: ABNORMAL
BASOPHILS # BLD: 0.1 K/UL (ref 0–0.2)
BASOPHILS NFR BLD: 0.4 %
BENZODIAZ UR QL SCN: ABNORMAL
BILIRUB SERPL-MCNC: <0.2 MG/DL (ref 0.2–0.7)
BILIRUB UR QL STRIP: NEGATIVE
BUN SERPL-MCNC: 14 MG/DL (ref 6–20)
CALCIUM SERPL-MCNC: 9.7 MG/DL (ref 8.5–9.9)
CANNABINOIDS UR QL SCN: POSITIVE
CHLORIDE SERPL-SCNC: 102 MEQ/L (ref 95–107)
CHOLEST SERPL-MCNC: 172 MG/DL (ref 0–199)
CK SERPL-CCNC: 47 U/L (ref 0–170)
CLARITY UR: CLEAR
CO2 SERPL-SCNC: 26 MEQ/L (ref 20–31)
COCAINE UR QL SCN: ABNORMAL
COLOR UR: YELLOW
CREAT SERPL-MCNC: 0.72 MG/DL (ref 0.5–0.9)
DRUG SCREEN COMMENT UR-IMP: ABNORMAL
EOSINOPHIL # BLD: 0.1 K/UL (ref 0–0.7)
EOSINOPHIL NFR BLD: 0.8 %
EPI CELLS #/AREA URNS AUTO: NORMAL /HPF (ref 0–5)
ERYTHROCYTE [DISTWIDTH] IN BLOOD BY AUTOMATED COUNT: 12.7 % (ref 11.5–14.5)
ETHANOL PERCENT: NORMAL G/DL
ETHANOLAMINE SERPL-MCNC: <10 MG/DL (ref 0–0.08)
FENTANYL SCREEN, URINE: ABNORMAL
GLOBULIN SER CALC-MCNC: 3.1 G/DL (ref 2.3–3.5)
GLUCOSE SERPL-MCNC: 107 MG/DL (ref 70–99)
GLUCOSE UR STRIP-MCNC: NEGATIVE MG/DL
HCG UR QL: NEGATIVE
HCT VFR BLD AUTO: 43.7 % (ref 37–47)
HDLC SERPL-MCNC: 67 MG/DL (ref 40–59)
HGB BLD-MCNC: 14.6 G/DL (ref 12–16)
HGB UR QL STRIP: ABNORMAL
HYALINE CASTS #/AREA URNS AUTO: NORMAL /HPF (ref 0–5)
KETONES UR STRIP-MCNC: NEGATIVE MG/DL
LDLC SERPL CALC-MCNC: 89 MG/DL (ref 0–129)
LEUKOCYTE ESTERASE UR QL STRIP: NEGATIVE
LYMPHOCYTES # BLD: 1.5 K/UL (ref 1–4.8)
LYMPHOCYTES NFR BLD: 11.3 %
MCH RBC QN AUTO: 31.3 PG (ref 27–31.3)
MCHC RBC AUTO-ENTMCNC: 33.4 % (ref 33–37)
MCV RBC AUTO: 93.6 FL (ref 79.4–94.8)
METHADONE UR QL SCN: ABNORMAL
MONOCYTES # BLD: 0.9 K/UL (ref 0.2–0.8)
MONOCYTES NFR BLD: 7 %
NEUTROPHILS # BLD: 10.6 K/UL (ref 1.4–6.5)
NEUTS SEG NFR BLD: 80.3 %
NITRITE UR QL STRIP: NEGATIVE
OPIATES UR QL SCN: ABNORMAL
OXYCODONE UR QL SCN: ABNORMAL
PCP UR QL SCN: ABNORMAL
PH UR STRIP: 5 [PH] (ref 5–9)
PLATELET # BLD AUTO: 380 K/UL (ref 130–400)
POTASSIUM SERPL-SCNC: 3.7 MEQ/L (ref 3.4–4.9)
PROPOXYPH UR QL SCN: ABNORMAL
PROT SERPL-MCNC: 7.3 G/DL (ref 6.3–8)
PROT UR STRIP-MCNC: NEGATIVE MG/DL
RBC # BLD AUTO: 4.67 M/UL (ref 4.2–5.4)
RBC #/AREA URNS AUTO: NORMAL /HPF (ref 0–5)
SALICYLATES SERPL-MCNC: <0.3 MG/DL (ref 15–30)
SODIUM SERPL-SCNC: 140 MEQ/L (ref 135–144)
SP GR UR STRIP: 1.02 (ref 1–1.03)
TRIGL SERPL-MCNC: 82 MG/DL (ref 0–150)
TSH SERPL-MCNC: 0.89 UIU/ML (ref 0.44–3.86)
URINE REFLEX TO CULTURE: ABNORMAL
UROBILINOGEN UR STRIP-ACNC: 0.2 E.U./DL
WBC # BLD AUTO: 13.2 K/UL (ref 4.8–10.8)
WBC #/AREA URNS AUTO: NORMAL /HPF (ref 0–5)

## 2024-04-13 PROCEDURE — 1240000000 HC EMOTIONAL WELLNESS R&B

## 2024-04-13 PROCEDURE — 80143 DRUG ASSAY ACETAMINOPHEN: CPT

## 2024-04-13 PROCEDURE — 85025 COMPLETE CBC W/AUTO DIFF WBC: CPT

## 2024-04-13 PROCEDURE — 84703 CHORIONIC GONADOTROPIN ASSAY: CPT

## 2024-04-13 PROCEDURE — 99285 EMERGENCY DEPT VISIT HI MDM: CPT

## 2024-04-13 PROCEDURE — 80061 LIPID PANEL: CPT

## 2024-04-13 PROCEDURE — 81001 URINALYSIS AUTO W/SCOPE: CPT

## 2024-04-13 PROCEDURE — 84443 ASSAY THYROID STIM HORMONE: CPT

## 2024-04-13 PROCEDURE — 80307 DRUG TEST PRSMV CHEM ANLYZR: CPT

## 2024-04-13 PROCEDURE — 80179 DRUG ASSAY SALICYLATE: CPT

## 2024-04-13 PROCEDURE — 80053 COMPREHEN METABOLIC PANEL: CPT

## 2024-04-13 PROCEDURE — 36415 COLL VENOUS BLD VENIPUNCTURE: CPT

## 2024-04-13 PROCEDURE — 83036 HEMOGLOBIN GLYCOSYLATED A1C: CPT

## 2024-04-13 PROCEDURE — 82077 ASSAY SPEC XCP UR&BREATH IA: CPT

## 2024-04-13 PROCEDURE — 82550 ASSAY OF CK (CPK): CPT

## 2024-04-13 RX ORDER — MAGNESIUM HYDROXIDE/ALUMINUM HYDROXICE/SIMETHICONE 120; 1200; 1200 MG/30ML; MG/30ML; MG/30ML
30 SUSPENSION ORAL PRN
Status: DISCONTINUED | OUTPATIENT
Start: 2024-04-13 | End: 2024-04-17 | Stop reason: HOSPADM

## 2024-04-13 RX ORDER — HALOPERIDOL 5 MG/ML
5 INJECTION INTRAMUSCULAR EVERY 6 HOURS PRN
Status: DISCONTINUED | OUTPATIENT
Start: 2024-04-13 | End: 2024-04-17 | Stop reason: HOSPADM

## 2024-04-13 RX ORDER — ACETAMINOPHEN 325 MG/1
650 TABLET ORAL EVERY 4 HOURS PRN
Status: DISCONTINUED | OUTPATIENT
Start: 2024-04-13 | End: 2024-04-17 | Stop reason: HOSPADM

## 2024-04-13 RX ORDER — BENZTROPINE MESYLATE 1 MG/ML
2 INJECTION INTRAMUSCULAR; INTRAVENOUS 2 TIMES DAILY PRN
Status: DISCONTINUED | OUTPATIENT
Start: 2024-04-13 | End: 2024-04-17 | Stop reason: HOSPADM

## 2024-04-13 RX ORDER — TRAZODONE HYDROCHLORIDE 50 MG/1
50 TABLET ORAL NIGHTLY PRN
Status: DISCONTINUED | OUTPATIENT
Start: 2024-04-13 | End: 2024-04-17 | Stop reason: HOSPADM

## 2024-04-13 RX ORDER — HYDROXYZINE HYDROCHLORIDE 50 MG/ML
50 INJECTION, SOLUTION INTRAMUSCULAR EVERY 6 HOURS PRN
Status: DISCONTINUED | OUTPATIENT
Start: 2024-04-13 | End: 2024-04-17 | Stop reason: HOSPADM

## 2024-04-13 RX ORDER — HALOPERIDOL 5 MG/1
5 TABLET ORAL EVERY 6 HOURS PRN
Status: DISCONTINUED | OUTPATIENT
Start: 2024-04-13 | End: 2024-04-17 | Stop reason: HOSPADM

## 2024-04-13 RX ORDER — HYDROXYZINE PAMOATE 50 MG/1
50 CAPSULE ORAL EVERY 6 HOURS PRN
Status: DISCONTINUED | OUTPATIENT
Start: 2024-04-13 | End: 2024-04-17 | Stop reason: HOSPADM

## 2024-04-13 ASSESSMENT — LIFESTYLE VARIABLES
HOW MANY STANDARD DRINKS CONTAINING ALCOHOL DO YOU HAVE ON A TYPICAL DAY: 7 TO 9
HOW OFTEN DO YOU HAVE A DRINK CONTAINING ALCOHOL: 2-3 TIMES A WEEK

## 2024-04-13 NOTE — ED NOTES
Patient changed into psych safe clothing.   Urine sent to lab.   Skin and cobtraband check performed.   Contraband check negative at this time.   Lab called to draw blood.

## 2024-04-13 NOTE — ED NOTES
Lab at the bedside    Enbrel Counseling:  I discussed with the patient the risks of etanercept including but not limited to myelosuppression, immunosuppression, autoimmune hepatitis, demyelinating diseases, lymphoma, and infections.  The patient understands that monitoring is required including a PPD at baseline and must alert us or the primary physician if symptoms of infection or other concerning signs are noted.

## 2024-04-14 PROBLEM — F32.A DEPRESSION: Status: RESOLVED | Noted: 2024-04-13 | Resolved: 2024-04-14

## 2024-04-14 PROBLEM — F33.9 MAJOR DEPRESSIVE DISORDER, RECURRENT EPISODE WITH MIXED FEATURES (HCC): Status: ACTIVE | Noted: 2024-04-14

## 2024-04-14 PROBLEM — F60.89 CLUSTER B PERSONALITY DISORDER (HCC): Status: ACTIVE | Noted: 2024-04-14

## 2024-04-14 PROBLEM — F19.10 POLYSUBSTANCE ABUSE (HCC): Status: ACTIVE | Noted: 2024-04-14

## 2024-04-14 LAB
ESTIMATED AVERAGE GLUCOSE: 100 MG/DL
HBA1C MFR BLD: 5.1 % (ref 4–6)

## 2024-04-14 PROCEDURE — 6370000000 HC RX 637 (ALT 250 FOR IP): Performed by: PSYCHIATRY & NEUROLOGY

## 2024-04-14 PROCEDURE — 1240000000 HC EMOTIONAL WELLNESS R&B

## 2024-04-14 PROCEDURE — 6370000000 HC RX 637 (ALT 250 FOR IP): Performed by: NURSE PRACTITIONER

## 2024-04-14 RX ORDER — OXCARBAZEPINE 150 MG/1
150 TABLET, FILM COATED ORAL 2 TIMES DAILY
Status: DISCONTINUED | OUTPATIENT
Start: 2024-04-14 | End: 2024-04-15

## 2024-04-14 RX ORDER — CHLORDIAZEPOXIDE HYDROCHLORIDE 25 MG/1
25 CAPSULE, GELATIN COATED ORAL EVERY 6 HOURS PRN
Status: DISCONTINUED | OUTPATIENT
Start: 2024-04-14 | End: 2024-04-17 | Stop reason: HOSPADM

## 2024-04-14 RX ORDER — CITALOPRAM HYDROBROMIDE 10 MG/1
10 TABLET ORAL DAILY
Status: DISCONTINUED | OUTPATIENT
Start: 2024-04-14 | End: 2024-04-17 | Stop reason: HOSPADM

## 2024-04-14 RX ADMIN — OXCARBAZEPINE 150 MG: 150 TABLET, FILM COATED ORAL at 11:58

## 2024-04-14 RX ADMIN — ACETAMINOPHEN 325MG 650 MG: 325 TABLET ORAL at 13:49

## 2024-04-14 RX ADMIN — CITALOPRAM HYDROBROMIDE 10 MG: 10 TABLET ORAL at 11:58

## 2024-04-14 RX ADMIN — OXCARBAZEPINE 150 MG: 150 TABLET, FILM COATED ORAL at 21:27

## 2024-04-14 ASSESSMENT — PATIENT HEALTH QUESTIONNAIRE - PHQ9
10. IF YOU CHECKED OFF ANY PROBLEMS, HOW DIFFICULT HAVE THESE PROBLEMS MADE IT FOR YOU TO DO YOUR WORK, TAKE CARE OF THINGS AT HOME, OR GET ALONG WITH OTHER PEOPLE: VERY DIFFICULT
1. LITTLE INTEREST OR PLEASURE IN DOING THINGS: MORE THAN HALF THE DAYS
SUM OF ALL RESPONSES TO PHQ9 QUESTIONS 1 & 2: 4
9. THOUGHTS THAT YOU WOULD BE BETTER OFF DEAD, OR OF HURTING YOURSELF: SEVERAL DAYS
SUM OF ALL RESPONSES TO PHQ QUESTIONS 1-9: 14
3. TROUBLE FALLING OR STAYING ASLEEP: MORE THAN HALF THE DAYS
5. POOR APPETITE OR OVEREATING: SEVERAL DAYS
8. MOVING OR SPEAKING SO SLOWLY THAT OTHER PEOPLE COULD HAVE NOTICED. OR THE OPPOSITE, BEING SO FIGETY OR RESTLESS THAT YOU HAVE BEEN MOVING AROUND A LOT MORE THAN USUAL: NOT AT ALL
SUM OF ALL RESPONSES TO PHQ QUESTIONS 1-9: 14
SUM OF ALL RESPONSES TO PHQ QUESTIONS 1-9: 14
6. FEELING BAD ABOUT YOURSELF - OR THAT YOU ARE A FAILURE OR HAVE LET YOURSELF OR YOUR FAMILY DOWN: MORE THAN HALF THE DAYS
7. TROUBLE CONCENTRATING ON THINGS, SUCH AS READING THE NEWSPAPER OR WATCHING TELEVISION: MORE THAN HALF THE DAYS
SUM OF ALL RESPONSES TO PHQ QUESTIONS 1-9: 13
4. FEELING TIRED OR HAVING LITTLE ENERGY: MORE THAN HALF THE DAYS
2. FEELING DOWN, DEPRESSED OR HOPELESS: MORE THAN HALF THE DAYS

## 2024-04-14 ASSESSMENT — SLEEP AND FATIGUE QUESTIONNAIRES
DO YOU HAVE DIFFICULTY SLEEPING: YES
SLEEP PATTERN: DIFFICULTY FALLING ASLEEP;DISTURBED/INTERRUPTED SLEEP
AVERAGE NUMBER OF SLEEP HOURS: 3

## 2024-04-14 ASSESSMENT — PAIN DESCRIPTION - LOCATION: LOCATION: HEAD

## 2024-04-14 ASSESSMENT — PAIN SCALES - WONG BAKER: WONGBAKER_NUMERICALRESPONSE: HURTS A LITTLE BIT

## 2024-04-14 ASSESSMENT — PAIN SCALES - GENERAL
PAINLEVEL_OUTOF10: 2
PAINLEVEL_OUTOF10: 5

## 2024-04-14 NOTE — CONSULTS
HISTORY AND PHYSICAL             Date: 4/14/2024        Patient Name: Kim Santa     YOB: 1990      Age:  33 y.o.    Chief Complaint     Chief Complaint   Patient presents with    Suicidal     Depressed, increasing over past 3 weeks, hx of same,           History Obtained From   patient, electronic medical record    History of Present Illness   Patient is a 33-year-old female admitted for increased thoughts of suicide ideation for the past 3 weeks.  No plan mentioned.  Patient denies chest pain, palpitations, headache, dizziness, shortness of breath, cough, fever, chills, N/V/D, and changes in appetite.  Suicidal ideation during assessment.  She reports chronic headache; denies changes in vision.  Reports having appointment with neurologist in May for headaches.  No aggravating or relieving factors for headaches reported.  Patient denies chest pain, palpitations, lightheadedness, headache, dizziness, shortness of breath, cough, N/V/D, and changes in appetite.  Patient also denies smoking, illicit drug, and alcohol use.  Denies self-inflicted injuries or wounds.  Hospitalist consulted for evaluation of acute and chronic disease with recommendations.  Past Medical History   History reviewed. No pertinent past medical history.     Past Surgical History   History reviewed. No pertinent surgical history.     Medications Prior to Admission     Prior to Admission medications    Medication Sig Start Date End Date Taking? Authorizing Provider   naproxen (NAPROSYN) 500 MG tablet Take 1 tablet by mouth 2 times daily (with meals)  Patient not taking: Reported on 3/15/2024 1/30/24   Karly Lan NP-C   metroNIDAZOLE (METROGEL) 0.75 % gel Apply topically 2 times daily.  Patient not taking: Reported on 9/14/2023 3/13/23   Luís Aguilar PA        Allergies   Patient has no known allergies.    Social History     Social History       Tobacco History       Smoking Status  Never      Smokeless Tobacco
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
## 2024-04-14 NOTE — CARE COORDINATION
Group Therapy Note    Date: 4/14/2024  Start Time: 0900  End Time:  0920  Number of Participants: 14    Type of Group: Community Meeting    Patient's Goal:  Speak with the doctor about plan of care.     Notes: Patient declined to attend psychoeducation group at 0900 despite encouragement by staff.     Discipline Responsible: Psychoeducational Specialist    VENESSA Curtis

## 2024-04-14 NOTE — ED NOTES
Patient calm, awake and watching TV. Patient's significant other left and took their car keys. No distress noted.

## 2024-04-14 NOTE — ED NOTES
Patient transported to  with [facility]  Behavior in control.   Safety precautions

## 2024-04-14 NOTE — PROGRESS NOTES
Patient arrived to the unit via wheelchair accompanied by staff. Skin assessment and contraband search complete by this nurse and Kassi MENG. No contraband found.

## 2024-04-14 NOTE — ED PROVIDER NOTES
negative.       PAST MEDICAL HISTORY   History reviewed. No pertinent past medical history.      SURGICAL HISTORY     History reviewed. No pertinent surgical history.      CURRENT MEDICATIONS       Current Discharge Medication List        CONTINUE these medications which have NOT CHANGED    Details   naproxen (NAPROSYN) 500 MG tablet Take 1 tablet by mouth 2 times daily (with meals)  Qty: 60 tablet, Refills: 0      metroNIDAZOLE (METROGEL) 0.75 % gel Apply topically 2 times daily.  Qty: 45 g, Refills: 1    Associated Diagnoses: Acne rosacea             ALLERGIES     Patient has no known allergies.    FAMILY HISTORY       Family History   Problem Relation Age of Onset    Diabetes Mother     High Blood Pressure Mother     Lupus Father           SOCIAL HISTORY       Social History     Socioeconomic History    Marital status: Single     Spouse name: None    Number of children: None    Years of education: None    Highest education level: None   Tobacco Use    Smoking status: Never    Smokeless tobacco: Never   Vaping Use    Vaping Use: Some days    Substances: CBD   Substance and Sexual Activity    Alcohol use: Yes     Comment: socially    Drug use: Never     Social Determinants of Health     Financial Resource Strain: Low Risk  (3/15/2024)    Overall Financial Resource Strain (CARDIA)     Difficulty of Paying Living Expenses: Not hard at all   Food Insecurity: No Food Insecurity (3/15/2024)    Hunger Vital Sign     Worried About Running Out of Food in the Last Year: Never true     Ran Out of Food in the Last Year: Never true   Transportation Needs: Unknown (3/15/2024)    PRAPARE - Transportation     Lack of Transportation (Non-Medical): No   Housing Stability: Unknown (3/15/2024)    Housing Stability Vital Sign     Unstable Housing in the Last Year: No       SCREENINGS         Mike Coma Scale  Eye Opening: Spontaneous  Best Verbal Response: Oriented  Best Motor Response: Obeys commands  [employer] Coma Scale Score: 15

## 2024-04-15 PROCEDURE — 99232 SBSQ HOSP IP/OBS MODERATE 35: CPT | Performed by: PSYCHIATRY & NEUROLOGY

## 2024-04-15 PROCEDURE — 1240000000 HC EMOTIONAL WELLNESS R&B

## 2024-04-15 PROCEDURE — 6370000000 HC RX 637 (ALT 250 FOR IP): Performed by: NURSE PRACTITIONER

## 2024-04-15 PROCEDURE — 6370000000 HC RX 637 (ALT 250 FOR IP): Performed by: PSYCHIATRY & NEUROLOGY

## 2024-04-15 RX ORDER — PROPRANOLOL HYDROCHLORIDE 10 MG/1
10 TABLET ORAL 2 TIMES DAILY
Status: DISCONTINUED | OUTPATIENT
Start: 2024-04-15 | End: 2024-04-17 | Stop reason: HOSPADM

## 2024-04-15 RX ADMIN — OXCARBAZEPINE 150 MG: 150 TABLET, FILM COATED ORAL at 08:51

## 2024-04-15 RX ADMIN — PROPRANOLOL HYDROCHLORIDE 10 MG: 10 TABLET ORAL at 11:04

## 2024-04-15 RX ADMIN — PROPRANOLOL HYDROCHLORIDE 10 MG: 10 TABLET ORAL at 21:11

## 2024-04-15 RX ADMIN — CITALOPRAM HYDROBROMIDE 10 MG: 10 TABLET ORAL at 08:51

## 2024-04-15 ASSESSMENT — PATIENT HEALTH QUESTIONNAIRE - PHQ9
SUM OF ALL RESPONSES TO PHQ QUESTIONS 1-9: 23
9. THOUGHTS THAT YOU WOULD BE BETTER OFF DEAD, OR OF HURTING YOURSELF: MORE THAN HALF THE DAYS
SUM OF ALL RESPONSES TO PHQ QUESTIONS 1-9: 21
7. TROUBLE CONCENTRATING ON THINGS, SUCH AS READING THE NEWSPAPER OR WATCHING TELEVISION: NEARLY EVERY DAY
SUM OF ALL RESPONSES TO PHQ QUESTIONS 1-9: 23
10. IF YOU CHECKED OFF ANY PROBLEMS, HOW DIFFICULT HAVE THESE PROBLEMS MADE IT FOR YOU TO DO YOUR WORK, TAKE CARE OF THINGS AT HOME, OR GET ALONG WITH OTHER PEOPLE: VERY DIFFICULT
4. FEELING TIRED OR HAVING LITTLE ENERGY: MORE THAN HALF THE DAYS
3. TROUBLE FALLING OR STAYING ASLEEP: NEARLY EVERY DAY
2. FEELING DOWN, DEPRESSED OR HOPELESS: NEARLY EVERY DAY
1. LITTLE INTEREST OR PLEASURE IN DOING THINGS: NEARLY EVERY DAY
5. POOR APPETITE OR OVEREATING: MORE THAN HALF THE DAYS
8. MOVING OR SPEAKING SO SLOWLY THAT OTHER PEOPLE COULD HAVE NOTICED. OR THE OPPOSITE, BEING SO FIGETY OR RESTLESS THAT YOU HAVE BEEN MOVING AROUND A LOT MORE THAN USUAL: MORE THAN HALF THE DAYS
SUM OF ALL RESPONSES TO PHQ9 QUESTIONS 1 & 2: 6
6. FEELING BAD ABOUT YOURSELF - OR THAT YOU ARE A FAILURE OR HAVE LET YOURSELF OR YOUR FAMILY DOWN: NEARLY EVERY DAY
SUM OF ALL RESPONSES TO PHQ QUESTIONS 1-9: 23

## 2024-04-15 ASSESSMENT — PAIN SCALES - GENERAL: PAINLEVEL_OUTOF10: 0

## 2024-04-15 ASSESSMENT — LIFESTYLE VARIABLES
HOW MANY STANDARD DRINKS CONTAINING ALCOHOL DO YOU HAVE ON A TYPICAL DAY: 5 OR 6
HOW OFTEN DO YOU HAVE A DRINK CONTAINING ALCOHOL: 2-4 TIMES A MONTH

## 2024-04-15 NOTE — CARE COORDINATION
Brief Intervention and Referral to Treatment Summary    Patient was provided PHQ-9, AUDIT-C and DAST Screening:      PHQ-9 Score: 23  AUDIT-C Score:  5  DAST Score:  1    Patient’s substance use is considered     Low Risk/Healthy  Moderate Risk x  Harmful   Dependent    Patient’s depression is considered:     Minimal  Mild   Moderate x  Moderately Severe  Severe    Brief Education Was Provided    Patient was receptive x  Patient was not receptive      Brief Intervention Is Provided (Only for AUDIT-C or DAST)     Patient reports readiness to decrease and/or stop use and a plan was discussed  x  Patient denies readiness to decrease and/or stop use and a plan was not discussed      Recommendations/Referrals for Brief and/or Specialized Treatment Provided to Patient   Pt to return home, follow up with OP provider and AA groups.

## 2024-04-15 NOTE — PROGRESS NOTES
[facility]  BEHAVIORAL HEALTH FOLLOW-UP NOTE       4/15/2024     Patient was seen and examined in person, Chart reviewed   Patient's case discussed with staff/team    Chief Complaint: Depression    Interim History:     Pt report feeling depressed, with mood swings  Racing thoughts  No SI today  Helpless feeling  Poor sleep   Appetite:   [] Normal/Unchanged  [] Increased  [x] Decreased      Sleep:       [] Normal/Unchanged  [x] Fair       [] Poor              Energy:    [] Normal/Unchanged  [] Increased  [x] Decreased        SI [] Present  [] Absent    HI  []Present  [x] Absent     Aggression:  [] yes  [] no    Patient is [] able  [x] unable to CONTRACT FOR SAFETY     PAST MEDICAL/PSYCHIATRIC HISTORY:   History reviewed. No pertinent past medical history.    FAMILY/SOCIAL HISTORY:  Family History   Problem Relation Age of Onset    Diabetes Mother     High Blood Pressure Mother     Lupus Father      Social History     Socioeconomic History    Marital status: Single     Spouse name: Not on file    Number of children: Not on file    Years of education: Not on file    Highest education level: Not on file   Occupational History    Not on file   Tobacco Use    Smoking status: Never    Smokeless tobacco: Never   Vaping Use    Vaping Use: Some days    Substances: CBD   Substance and Sexual Activity    Alcohol use: Yes     Comment: socially    Drug use: Never    Sexual activity: Not on file   Other Topics Concern    Not on file   Social History Narrative    Not on file     Social Determinants of Health     Financial Resource Strain: Low Risk  (3/15/2024)    Overall Financial Resource Strain (CARDIA)     Difficulty of Paying Living Expenses: Not hard at all   Food Insecurity: No Food Insecurity (3/15/2024)    Hunger Vital Sign     Worried About Running Out of Food in the Last Year: Never true     Ran Out of Food in the Last Year: Never true   Transportation Needs: Unknown (3/15/2024)    PRAPARE -

## 2024-04-15 NOTE — CARE COORDINATION
Leisure Assessment  April 15, 2024 /  1128 am    Appearance: Alert, Appears younger than stated age, Drowsy, and Pleasant  Current Mental Status: Calm, Cooperative, and Insight into issues  Affect/Mood: Congruent/ Sad  Thought Content/Processes: Linear and Vague  Insight/Judgement: Fair insight and Fair judgment  Speech: normal rate and normal volume  Delusions/Hallucinations: no evidence of delusions /  none observed/reported  Admit Status: Voluntary    Patient lying in bed and pleasant upon approach. Patient identified her leisure interests as biking, playing guitar, and walking with the dog. Patient reported that she enjoys spending time with others and by herself, but that she doesn't perceive that she has a support system. Patient shared that she tends to internalize her emotions, and this has worked for her until recently d/t increased stress. Patient did not elaborate on the increased stress, but did express that she was having SI and thoughts of harming herself prior to hospitalization. Patient brought self to the ER because she was \"afraid of making it through\" (in reference to SI), and did not want to hurt others in her life. Patient identified her strength as being resilient.     Recommendations: Increase Socialization, Improve/Maintain Coping Skills Development, Improve/Maintain Emotion Regulation Skills/Mood, Improve/Maintain Expressive Communication/Self-Expression, and Improve/Maintain Insight/Self-Awareness    Documentation completed by NICHO Capellan, PsychoEd Spec
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
## 2024-04-16 PROCEDURE — 6370000000 HC RX 637 (ALT 250 FOR IP): Performed by: NURSE PRACTITIONER

## 2024-04-16 PROCEDURE — 1240000000 HC EMOTIONAL WELLNESS R&B

## 2024-04-16 PROCEDURE — 6370000000 HC RX 637 (ALT 250 FOR IP)

## 2024-04-16 PROCEDURE — 99232 SBSQ HOSP IP/OBS MODERATE 35: CPT | Performed by: PSYCHIATRY & NEUROLOGY

## 2024-04-16 PROCEDURE — 90833 PSYTX W PT W E/M 30 MIN: CPT | Performed by: PSYCHIATRY & NEUROLOGY

## 2024-04-16 PROCEDURE — 6370000000 HC RX 637 (ALT 250 FOR IP): Performed by: PSYCHIATRY & NEUROLOGY

## 2024-04-16 RX ORDER — DOCUSATE SODIUM 100 MG/1
100 CAPSULE, LIQUID FILLED ORAL DAILY PRN
Status: DISCONTINUED | OUTPATIENT
Start: 2024-04-16 | End: 2024-04-17 | Stop reason: HOSPADM

## 2024-04-16 RX ADMIN — DOCUSATE SODIUM 100 MG: 100 CAPSULE, LIQUID FILLED ORAL at 17:19

## 2024-04-16 RX ADMIN — PROPRANOLOL HYDROCHLORIDE 10 MG: 10 TABLET ORAL at 20:38

## 2024-04-16 RX ADMIN — PROPRANOLOL HYDROCHLORIDE 10 MG: 10 TABLET ORAL at 10:12

## 2024-04-16 RX ADMIN — CITALOPRAM HYDROBROMIDE 10 MG: 10 TABLET ORAL at 10:12

## 2024-04-16 NOTE — PROGRESS NOTES
Pt did not attend [id number] despite staff encouragement.        Electronically signed by [place] Malik on 4/15/2024 at 8:13 PM

## 2024-04-16 NOTE — PROGRESS NOTES
[facility]  BEHAVIORAL HEALTH FOLLOW-UP NOTE       4/16/2024     Patient was seen and examined in person, Chart reviewed   Patient's case discussed with staff/team    Chief Complaint: Depression    Interim History:     Pt report feeling better, no mood swings  Denies any Racing thoughts  No SI today  Denies hopeless or worthless feeling  No active AH or VH  No paranoid thoughts  Appetite:   [] Normal/Unchanged  [] Increased  [x] Decreased      Sleep:       [] Normal/Unchanged  [x] Fair       [] Poor              Energy:    [x] Normal/Unchanged  [] Increased  [] Decreased        SI [] Present  [x] Absent    HI  []Present  [x] Absent     Aggression:  [] yes  [] no    Patient is [x] able  [] unable to CONTRACT FOR SAFETY     PAST MEDICAL/PSYCHIATRIC HISTORY:   History reviewed. No pertinent past medical history.    FAMILY/SOCIAL HISTORY:  Family History   Problem Relation Age of Onset    Diabetes Mother     High Blood Pressure Mother     Lupus Father      Social History     Socioeconomic History    Marital status: Single     Spouse name: Not on file    Number of children: Not on file    Years of education: Not on file    Highest education level: Not on file   Occupational History    Not on file   Tobacco Use    Smoking status: Never    Smokeless tobacco: Never   Vaping Use    Vaping Use: Some days    Substances: CBD   Substance and Sexual Activity    Alcohol use: Yes     Comment: socially    Drug use: Never    Sexual activity: Not on file   Other Topics Concern    Not on file   Social History Narrative    Not on file     Social Determinants of Health     Financial Resource Strain: Low Risk  (3/15/2024)    Overall Financial Resource Strain (CARDIA)     Difficulty of Paying Living Expenses: Not hard at all   Food Insecurity: No Food Insecurity (3/15/2024)    Hunger Vital Sign     Worried About Running Out of Food in the Last Year: Never true     Ran Out of Food in the Last Year: Never true

## 2024-04-17 VITALS
OXYGEN SATURATION: 100 % | SYSTOLIC BLOOD PRESSURE: 102 MMHG | WEIGHT: 120 LBS | TEMPERATURE: 98.2 F | HEIGHT: 63 IN | HEART RATE: 90 BPM | DIASTOLIC BLOOD PRESSURE: 80 MMHG | RESPIRATION RATE: 18 BRPM | BODY MASS INDEX: 21.26 KG/M2

## 2024-04-17 PROCEDURE — 6370000000 HC RX 637 (ALT 250 FOR IP): Performed by: NURSE PRACTITIONER

## 2024-04-17 PROCEDURE — 6370000000 HC RX 637 (ALT 250 FOR IP): Performed by: PSYCHIATRY & NEUROLOGY

## 2024-04-17 PROCEDURE — 99239 HOSP IP/OBS DSCHRG MGMT >30: CPT | Performed by: PSYCHIATRY & NEUROLOGY

## 2024-04-17 RX ORDER — PROPRANOLOL HYDROCHLORIDE 10 MG/1
10 TABLET ORAL 2 TIMES DAILY
Qty: 30 TABLET | Refills: 3 | Status: SHIPPED | OUTPATIENT
Start: 2024-04-17

## 2024-04-17 RX ORDER — CITALOPRAM HYDROBROMIDE 10 MG/1
10 TABLET ORAL DAILY
Qty: 15 TABLET | Refills: 3 | Status: SHIPPED | OUTPATIENT
Start: 2024-04-17

## 2024-04-17 RX ADMIN — PROPRANOLOL HYDROCHLORIDE 10 MG: 10 TABLET ORAL at 09:39

## 2024-04-17 RX ADMIN — CITALOPRAM HYDROBROMIDE 10 MG: 10 TABLET ORAL at 09:39

## 2024-04-17 NOTE — DISCHARGE SUMMARY
Yes     Comment: socially    Drug use: Never    Sexual activity: Not on file   Other Topics Concern    Not on file   Social History Narrative    Not on file     Social Determinants of Health     Financial Resource Strain: Low Risk  (3/15/2024)    Overall Financial Resource Strain (CARDIA)     Difficulty of Paying Living Expenses: Not hard at all   Food Insecurity: No Food Insecurity (3/15/2024)    Hunger Vital Sign     Worried About Running Out of Food in the Last Year: Never true     Ran Out of Food in the Last Year: Never true   Transportation Needs: Unknown (3/15/2024)    PRAPARE - Transportation     Lack of Transportation (Medical): Not on file     Lack of Transportation (Non-Medical): No   Physical Activity: Not on file   Stress: Not on file   Social Connections: Not on file   Intimate Partner Violence: Not on file   Housing Stability: Unknown (3/15/2024)    Housing Stability Vital Sign     Unable to Pay for Housing in the Last Year: Not on file     Number of Places Lived in the Last Year: Not on file     Unstable Housing in the Last Year: No       MEDICATIONS:    Current Facility-Administered Medications:     docusate sodium (COLACE) capsule 100 mg, 100 mg, Oral, Daily PRN, Rocio Woody APRN - CNP, 100 mg at 04/16/24 1719    propranolol (INDERAL) tablet 10 mg, 10 mg, Oral, BID, Shen Cartagena MD, 10 mg at 04/16/24 2038    citalopram (CELEXA) tablet 10 mg, 10 mg, Oral, Daily, Clarissa Motta APRN - CNP, 10 mg at 04/16/24 1012    chlordiazePOXIDE (LIBRIUM) capsule 25 mg, 25 mg, Oral, Q6H PRN, Clarissa Motta, APRN - CNP    hydrOXYzine pamoate (VISTARIL) capsule 50 mg, 50 mg, Oral, Q6H PRN **OR** hydrOXYzine (VISTARIL) injection 50 mg, 50 mg, IntraMUSCular, Q6H PRN, Kristy Beltran MD    acetaminophen (TYLENOL) tablet 650 mg, 650 mg, Oral, Q4H PRN, Kristy Beltran MD, 650 mg at 04/14/24 1349    magnesium hydroxide (MILK OF MAGNESIA) 400 MG/5ML suspension 30 mL, 30 mL, Oral, Daily PRN, Ruby,

## 2024-04-17 NOTE — DISCHARGE INSTR - DIET

## 2024-04-17 NOTE — PROGRESS NOTES
CLINICAL PHARMACY NOTE: MEDS TO BEDS    Total # of Prescriptions Filled: 2   The following medications were delivered to the patient:  Propranolol 10mg tab  Citalopram 10mg tab    Additional Documentation:

## 2024-04-17 NOTE — DISCHARGE INSTRUCTIONS
Keep all follow up appointments, take medications as ordered, utilize positive supports, abstain from use of alcohol and drugs. If symptoms return or you feel at risk to yourself or others, please call 911, return the nearest emergency room, or call your local crisis hotline:  [facility]: [phone]  [facility]: [phone]  [facility]: [phone]    Due to the Covid-19 Pandemic, [facility]essation [facility] is not currently available. For assistance with quitting smoking please go to https://smokefree.gov. A prescription for an FDA-approved tobacco cessation medication was offered at discharge and the patient refused.
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
## 2024-04-17 NOTE — GROUP NOTE
Group Therapy Note    Date: 4/14/2024    Group Start Time: 1000  Group End Time: 1050  Group Topic: Art Therapy     [facility] 3[facility] Virginia Dey, VENESSA        Group Therapy Note    Attendees: 13       Patient's Goal:  Speak with the doctor about plan of care.     Notes:  Patient did not attend.     Modes of Intervention: Activity      Discipline Responsible: Psychoeducational Specialist      Signature:  [place] GRACIA    
                                                                      Group Therapy Note    Date: 4/15/2024    Group Start Time: 1210  Group End Time: 1300  Group Topic: Activity    [facility] 3[facility] Charis Sterling        Group Therapy Note    Attendees: 10           Notes:  Pt did not attend today's positive affirmation group session.      Modes of Intervention: Activity      Discipline Responsible: Behavorial Health Tech      Signature:  Charis Cooper    
                                                                      Group Therapy Note    Date: 4/15/2024    Group Start Time: 1910  Group End Time: 1945  Group Topic: Wrap-Up    [facility] 3[facility] Carla Chopra        Group Therapy Note    Attendees: 10/23       Patient's Goal:  Pt goal is to manage anxiety        Status After Intervention:  Unchanged    Participation Level: Interactive    Participation Quality: Attentive      Speech:  normal      Thought Process/Content: Logical      Affective Functioning: Congruent      Mood: euthymic      Level of consciousness:  Attentive      Response to Learning: Able to verbalize current knowledge/experience      Endings: None Reported    Modes of Intervention: Support      Discipline Responsible: PCA      Signature:  Carla Newell    
                                                                      Group Therapy Note    Date: 4/15/2024    Group Start Time: 1915  Group End Time: 1945  Group Topic: Recreational    [facility] Carla Chopra        Group Therapy Note    Attendees: 12/23       Patient's Goal:  Pt was able to socialize while playing pictionary        Status After Intervention:  Unchanged    Participation Level: Interactive    Participation Quality: Attentive      Speech:  normal      Thought Process/Content: Logical      Affective Functioning: Congruent      Mood: euphoric      Level of consciousness:  Attentive      Response to Learning: Able to verbalize current knowledge/experience      Endings: None Reported    Modes of Intervention: Activity      Discipline Responsible: PCA      Signature:  Carla Newell    
                                                                      Group Therapy Note    Date: 4/16/2024    Group Start Time: 1215  Group End Time: 1255  Group Topic: Activity    [facility] Charis Sterling        Group Therapy Note    Attendees: 8       Patient's Goal:  No goal    Notes:  Pt did not attend today's group activity.     Modes of Intervention: Activity      Discipline Responsible: Behavorial Health Tech      Signature:  Charis Cooper    
                                                                      Group Therapy Note    Date: 4/16/2024    Group Start Time: 1600  Group End Time: 1630  Group Topic: Wrap-Up    [facility] Carla Chopra        Group Therapy Note    Attendees: 13/23       Patient's Goal:  Pt goal is to be more proactive        Status After Intervention:  Unchanged    Participation Level: Interactive    Participation Quality: Appropriate      Speech:  normal      Thought Process/Content: Logical      Affective Functioning: Congruent      Mood: euthymic      Level of consciousness:  Attentive      Response to Learning: Able to verbalize current knowledge/experience      Endings: None Reported    Modes of Intervention: Support      Discipline Responsible: PCA      Signature:  Carla Newell    
                                                                      Group Therapy Note    Date: 4/17/2024    Group Start Time: 1000  Group End Time: 1100  Group Topic: Art Therapy     [facility] 3[facility] Virginia Dey, VENESSA        Group Therapy Note    Attendees: 10       Patient's Goal:  Drink more water and go home.     Notes:  Patient did not attend.     Modes of Intervention: Activity      Discipline Responsible: Psychoeducational Specialist      Signature:  [place] GRACIA    
Date: 4/15/2024    Group Start Time: 0940  Group End Time: 1050  Group Topic: Music Therapy    [facility] 3[facility] Anabella Landry    Album of Me  Patients will listen to \"100 Years\" by [employer] and discuss lyrics and song interpretations as a group. Patients will discuss how their use of music may/may not have changed throughout their lives. Patients will create an \"Album of Me\" where they identify songs from childhood, teenage years, adulthood, where they are currently, and their favorite song right now. Patients will be invited to select a song and be encouraged to explain their connection to it. Patients create a group playlist and contribute one song each. Patients will listen to the playlist and reflect on their experiences afterwards.     Focus: Coping Skills, Validation/Support, Self-Esteem, Self-Expression, & Insight Development    Goals: Improve Mood, Improve Insight/Self-Awareness, Increase Socialization/Community Building, Increase Self-Expression, Improve Attention to Task, Improve Coping Skills/Develop Coping Skills     Patient's Goal: no goal  Attendance: Did not attend  Modes of Intervention: Dorothea Analysis and Song Discussion, Music Reminiscence, Playlist Building, and Receptive Music Listening    Discipline Responsible: Music Therapist  Signature: Anabella Pollock, MT-BC, PsychEd Spec  
Date: 4/15/2024    Group Start Time: 1330  Group End Time: 1430  Group Topic: Music Therapy    [facility] 3[facility] Anabella Landry    Active Music Making and Live Music Listening  Patients will be given a songbook and offered the opportunity to select (a) song(s) of their choice for live music listening on piano. Patients will be encouraged to sing along, move along, and listen to the music.     Focus: Self-Expression, Creativity, Processing, and Self-Esteem    Goals: Improve Mood, Decrease Isolation, Increase Sense of Community/Socialization, Improve Self-Esteem, Increase Self-Expression, Provide Sense of Autonomy, Develop Coping Skills    Patient selected several songs for live music listening. Patient moved and sang along to familiar music, and appropriately socialized with peers/staff as related to the music.     Attended: Full attendance  Participation Level: Active Listener and Interactive  Participation Quality: Attentive and Sharing  Affect/Mood: Constricted/Euthymic  Speech: normal rate and normal volume   Thought Content/Processes: Linear  Level of consciousness: Alert  Response: Able to verbalize current knowledge/experience  Successfully internalized the purpose of intervention and Actively participated in the group experience  Modes of Intervention: Active Music Making and Live Music Listening    Discipline Responsible: Music Therapist  Signature: Anabella Pollock, NICHO, PsychEd Spec  
Date: 4/16/2024    Group Start Time: 0935  Group End Time: 1020  Group Topic: Psychoeducation    [facility] 3W Anabella Landry    Building Happiness and Domains of Wellness    Patients will discuss the 7 domains of wellness: physical, occupational, social, intellectual, environmental, emotional/psychological, and spiritual. Patients will identify areas that they excel in, and areas that they would like to work on in the future. Patients will reflect on the domains and how they impact overall health and well-being.    Intended Outcomes: Able to reflect/comment on own behavior, Able to manage/cope with feelings, Able to experience relief/decrease in symptoms, Able to self-disclose, Discussed coping, Displayed empathy, Identified feelings, Identified triggers, Identified distorted thoughts/beliefs, and Increased hopefulness    Attendance: Did not attend  Education, Support, Exploration, Clarifying, Problem-solving, and Confrontation    Discipline Responsible: Music Therapist  Signature: NICHO Capellan, PsychEd Spec  
Date: 4/16/2024    Group Start Time: 1340  Group End Time: 1425  Group Topic: Music Therapy    [facility] 3W Anabella Landry Dorothea Poetry and Song Sharing  Patients will be given a sheet of lyrics compiled from different songs related to mental health, substance abuse recovery, and healing. Patients will color, highlight, underline, etc. lyrics/parts of lyrics that stand out to them. Patients will cross out/\"black out\" any remaining lyrics and encouraged to utilize colored pencils/markers to make it their own. Patients will share their new poems with the group and discuss how dorothea interpretations changed. If there is time, patients will be invited to hear a song they enjoy/connect with.     Focus: Self-Esteem, Creativity, Self-Expression, Having Positive Experiences    Goals: Improve Mood, Improve Insight/Self-Awareness, Increase Socialization/Community Building, Increase Self-Expression, Improve Attention to Task, Improve Coping Skills/Develop Coping Skills    Patient independently completed her blackout dorothea poetry, but declined to share with the group. Patient verbalized support for peers' contributions and was smiling and laughing throughout group. Patient socialized with peers appropriately.     Attended: Full attendance  Participation Level: Active Listener and Interactive  Participation Quality: Attentive and Supportive  Affect/Mood: Congruent/Euthymic, Quiet, and Reserved  Speech: normal rate and normal volume   Thought Content/Processes: Linear  Level of consciousness: Alert  Response: Able to verbalize current knowledge/experience  Actively participated in the group experience  Modes of Intervention: Composition, Dorothea Analysis and Song Discussion, and Receptive Music Listening    Discipline Responsible: Music Therapist  Signature: NICHO Capellan, PsychEd Spec  
Patient did not attend 1730 group despite staff encouragement.  
Anticipated discharge today  Modes of Intervention: Music and Mindfulness and Receptive Music Listening    Discipline Responsible: Music Therapist  Signature: NICHO Capellan, PsychEd Spec

## 2024-04-17 NOTE — TRANSITION OF CARE
03/14/1990, 06/14/1991, 06/21/1994    Hep B, ENGERIX-B, RECOMBIVAX-HB, (age Birth - 19y), IM, 0.5mL 05/14/2002, 07/09/2002, 11/14/2002    Hib PRP-OMP, PEDVAXHIB, (age 2m-6y, Adlt Risk), IM, 0.5mL 02/14/1991    Influenza, FLUCELVAX, (age 6 mo+), MDCK, PF, 0.5mL 10/11/2023    MMR, PRIORIX, M-M-R II, (age 12m+), SC, 0.5mL 08/21/1990, 03/17/1994    Poliovirus, IPOL, (age 6w+), SC/IM, 0.5mL 1989, 01/18/1990, 03/04/1990, 02/14/1991, 06/21/1994    TDaP, ADACEL (age 10y-64y), BOOSTRIX (age 10y+), IM, 0.5mL 06/21/2007     None of the above/Not documented/Unable to determine from medical record documentation, N/A flu vaccine season is over    Screening for Metabolic Disorders for Patients on Antipsychotic Medications  (Data obtained from the EMR)    Estimated Body Mass Index  Body mass index is 21.26 kg/m².      Vital Signs/Blood Pressure  /80   Pulse 90   Temp 98.2 °F (36.8 °C) (Oral)   Resp 18   Ht 1.6 m (5' 3\")   Wt 54.4 kg (120 lb)   LMP 03/23/2024 (Exact Date)   SpO2 100%   BMI 21.26 kg/m²      Fasting Blood Glucose or Hemoglobin A1c  No results found for: \"GLU\", \"GLUCPOC\"    Hemoglobin A1C   Date Value Ref Range Status   04/13/2024 5.1 4.0 - 6.0 % Final       Discharge Diagnosis: Major Depressive Disorder    Discharge Plan/Destination: follow up with [facility] and [facility] for all needs/discharge home     Discharge Medication List and Instructions:      Medication List        START taking these medications      citalopram 10 MG tablet  Commonly known as: CELEXA  Take 1 tablet by mouth daily     propranolol 10 MG tablet  Commonly known as: INDERAL  Take 1 tablet by mouth 2 times daily            STOP taking these medications      metroNIDAZOLE 0.75 % gel  Commonly known as: METROGEL     naproxen 500 MG tablet  Commonly known as: NAPROSYN               Where to Get Your Medications        [facility] - [place], [place] - [address]

## 2024-04-18 LAB
EKG ATRIAL RATE: 70 BPM
EKG P AXIS: 12 DEGREES
EKG P-R INTERVAL: 140 MS
EKG Q-T INTERVAL: 358 MS
EKG QRS DURATION: 84 MS
EKG QTC CALCULATION (BAZETT): 386 MS
EKG R AXIS: 82 DEGREES
EKG T AXIS: 63 DEGREES
EKG VENTRICULAR RATE: 70 BPM

## 2024-09-16 ENCOUNTER — TELEPHONE (OUTPATIENT)
Dept: FAMILY MEDICINE CLINIC | Age: 34
End: 2024-09-16